# Patient Record
Sex: FEMALE | Race: WHITE
[De-identification: names, ages, dates, MRNs, and addresses within clinical notes are randomized per-mention and may not be internally consistent; named-entity substitution may affect disease eponyms.]

---

## 2018-11-02 ENCOUNTER — HOSPITAL ENCOUNTER (EMERGENCY)
Dept: HOSPITAL 84 - D.ER | Age: 43
Discharge: HOME | End: 2018-11-02
Payer: COMMERCIAL

## 2018-11-02 VITALS — WEIGHT: 260 LBS | HEIGHT: 67 IN | BODY MASS INDEX: 40.81 KG/M2 | HEIGHT: 67 IN | WEIGHT: 260 LBS

## 2018-11-02 VITALS — DIASTOLIC BLOOD PRESSURE: 88 MMHG | SYSTOLIC BLOOD PRESSURE: 162 MMHG

## 2018-11-02 DIAGNOSIS — S80.01XA: Primary | ICD-10-CM
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
## 2018-11-02 DIAGNOSIS — M25.511: ICD-10-CM
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
## 2018-11-02 DIAGNOSIS — S13.4XXA: ICD-10-CM

## 2018-11-02 DIAGNOSIS — S46.911A: ICD-10-CM

## 2018-11-02 DIAGNOSIS — R51: ICD-10-CM

## 2018-11-02 DIAGNOSIS — F17.200: ICD-10-CM

## 2018-11-02 LAB
APPEARANCE UR: CLEAR
BILIRUB SERPL-MCNC: NEGATIVE MG/DL
COLOR UR: YELLOW
GLUCOSE SERPL-MCNC: 50 MG/DL
HCG UR QL: NEGATIVE
KETONES UR STRIP-MCNC: NEGATIVE MG/DL
NITRITE UR-MCNC: NEGATIVE MG/ML
PH UR STRIP: 7 [PH] (ref 5–6)
PROT UR-MCNC: NEGATIVE MG/DL
SP GR UR STRIP: 1.01 (ref 1–1.02)
UROBILINOGEN UR-MCNC: NORMAL MG/DL

## 2018-11-08 ENCOUNTER — HOSPITAL ENCOUNTER (OUTPATIENT)
Dept: HOSPITAL 84 - D.MRI | Age: 43
Discharge: HOME | End: 2018-11-08
Attending: FAMILY MEDICINE
Payer: COMMERCIAL

## 2018-11-08 DIAGNOSIS — S06.0X0D: Primary | ICD-10-CM
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
## 2018-11-21 ENCOUNTER — HOSPITAL ENCOUNTER (EMERGENCY)
Dept: HOSPITAL 84 - D.ER | Age: 43
Discharge: HOME | End: 2018-11-21
Payer: MEDICARE

## 2018-11-21 VITALS — SYSTOLIC BLOOD PRESSURE: 118 MMHG | DIASTOLIC BLOOD PRESSURE: 82 MMHG

## 2018-11-21 VITALS — BODY MASS INDEX: 39.71 KG/M2 | HEIGHT: 67 IN | WEIGHT: 253 LBS

## 2018-11-21 DIAGNOSIS — R05: Primary | ICD-10-CM

## 2018-11-21 DIAGNOSIS — F17.200: ICD-10-CM

## 2018-11-21 DIAGNOSIS — R00.0: ICD-10-CM

## 2018-11-21 DIAGNOSIS — R06.2: ICD-10-CM

## 2018-11-21 DIAGNOSIS — R06.02: ICD-10-CM

## 2018-11-21 DIAGNOSIS — R73.9: ICD-10-CM

## 2018-11-21 LAB
ALBUMIN SERPL-MCNC: 3.4 G/DL (ref 3.4–5)
ALP SERPL-CCNC: 77 U/L (ref 46–116)
ALT SERPL-CCNC: 22 U/L (ref 10–68)
ANION GAP SERPL CALC-SCNC: 11 MMOL/L (ref 8–16)
BASOPHILS NFR BLD AUTO: 0.2 % (ref 0–2)
BILIRUB SERPL-MCNC: 0.22 MG/DL (ref 0.2–1.3)
BUN SERPL-MCNC: 21 MG/DL (ref 7–18)
CALCIUM SERPL-MCNC: 9.2 MG/DL (ref 8.5–10.1)
CHLORIDE SERPL-SCNC: 101 MMOL/L (ref 98–107)
CO2 SERPL-SCNC: 27.9 MMOL/L (ref 21–32)
CREAT SERPL-MCNC: 1 MG/DL (ref 0.6–1.3)
EOSINOPHIL NFR BLD: 0.2 % (ref 0–7)
ERYTHROCYTE [DISTWIDTH] IN BLOOD BY AUTOMATED COUNT: 26.6 % (ref 11.5–14.5)
GLOBULIN SER-MCNC: 4.5 G/L
GLUCOSE SERPL-MCNC: 298 MG/DL (ref 74–106)
HCT VFR BLD CALC: 26.2 % (ref 36–48)
HGB BLD-MCNC: 12.9 G/DL (ref 12–16)
IMM GRANULOCYTES NFR BLD: 0.3 % (ref 0–5)
LYMPHOCYTES NFR BLD AUTO: 13.9 % (ref 15–50)
MCH RBC QN AUTO: 46.2 PG (ref 26–34)
MCHC RBC AUTO-ENTMCNC: 49.2 G/DL (ref 31–37)
MCV RBC: 93.9 FL (ref 80–100)
MONOCYTES NFR BLD: 6.7 % (ref 2–11)
NEUTROPHILS NFR BLD AUTO: 78.7 % (ref 40–80)
OSMOLALITY SERPL CALC.SUM OF ELEC: 285 MOSM/KG (ref 275–300)
PLATELET # BLD: 290 10X3/UL (ref 130–400)
PMV BLD AUTO: 10.2 FL (ref 7.4–10.4)
POTASSIUM SERPL-SCNC: 3.9 MMOL/L (ref 3.5–5.1)
PROT SERPL-MCNC: 7.9 G/DL (ref 6.4–8.2)
RBC # BLD AUTO: 2.79 10X6/UL (ref 4–5.4)
SODIUM SERPL-SCNC: 136 MMOL/L (ref 136–145)
WBC # BLD AUTO: 12 10X3/UL (ref 4.8–10.8)

## 2018-11-28 ENCOUNTER — HOSPITAL ENCOUNTER (INPATIENT)
Dept: HOSPITAL 84 - D.MS | Age: 43
LOS: 5 days | Discharge: HOME | DRG: 178 | End: 2018-12-03
Attending: FAMILY MEDICINE | Admitting: FAMILY MEDICINE
Payer: COMMERCIAL

## 2018-11-28 VITALS — WEIGHT: 261.2 LBS | BODY MASS INDEX: 41 KG/M2 | BODY MASS INDEX: 41 KG/M2 | BODY MASS INDEX: 41 KG/M2 | HEIGHT: 67 IN

## 2018-11-28 VITALS — SYSTOLIC BLOOD PRESSURE: 117 MMHG | DIASTOLIC BLOOD PRESSURE: 70 MMHG

## 2018-11-28 VITALS — DIASTOLIC BLOOD PRESSURE: 62 MMHG | SYSTOLIC BLOOD PRESSURE: 107 MMHG

## 2018-11-28 VITALS — DIASTOLIC BLOOD PRESSURE: 82 MMHG | SYSTOLIC BLOOD PRESSURE: 123 MMHG

## 2018-11-28 DIAGNOSIS — E83.42: ICD-10-CM

## 2018-11-28 DIAGNOSIS — J44.1: ICD-10-CM

## 2018-11-28 DIAGNOSIS — J44.0: ICD-10-CM

## 2018-11-28 DIAGNOSIS — F17.213: ICD-10-CM

## 2018-11-28 DIAGNOSIS — J15.6: Primary | ICD-10-CM

## 2018-11-28 LAB
ALBUMIN SERPL-MCNC: 3.1 G/DL (ref 3.4–5)
ALP SERPL-CCNC: 75 U/L (ref 46–116)
ALT SERPL-CCNC: 19 U/L (ref 10–68)
ANION GAP SERPL CALC-SCNC: 11.8 MMOL/L (ref 8–16)
BASOPHILS NFR BLD AUTO: 0.1 % (ref 0–2)
BILIRUB SERPL-MCNC: 0.31 MG/DL (ref 0.2–1.3)
BUN SERPL-MCNC: 10 MG/DL (ref 7–18)
CALCIUM SERPL-MCNC: 8.7 MG/DL (ref 8.5–10.1)
CHLORIDE SERPL-SCNC: 101 MMOL/L (ref 98–107)
CO2 SERPL-SCNC: 27.4 MMOL/L (ref 21–32)
CREAT SERPL-MCNC: 0.9 MG/DL (ref 0.6–1.3)
EOSINOPHIL NFR BLD: 3.1 % (ref 0–7)
ERYTHROCYTE [DISTWIDTH] IN BLOOD BY AUTOMATED COUNT: 20.2 % (ref 11.5–14.5)
GLOBULIN SER-MCNC: 4.4 G/L
GLUCOSE SERPL-MCNC: 178 MG/DL (ref 74–106)
HCT VFR BLD CALC: 36.8 % (ref 36–48)
HGB BLD-MCNC: 12.8 G/DL (ref 12–16)
IMM GRANULOCYTES NFR BLD: 0.2 % (ref 0–5)
LYMPHOCYTES NFR BLD AUTO: 23.7 % (ref 15–50)
MCH RBC QN AUTO: 29.9 PG (ref 26–34)
MCHC RBC AUTO-ENTMCNC: 34.8 G/DL (ref 31–37)
MCV RBC: 86 FL (ref 80–100)
MONOCYTES NFR BLD: 7.8 % (ref 2–11)
NEUTROPHILS NFR BLD AUTO: 65.1 % (ref 40–80)
OSMOLALITY SERPL CALC.SUM OF ELEC: 274 MOSM/KG (ref 275–300)
PLATELET # BLD: 229 10X3/UL (ref 130–400)
PMV BLD AUTO: 10.1 FL (ref 7.4–10.4)
POTASSIUM SERPL-SCNC: 4.2 MMOL/L (ref 3.5–5.1)
PROT SERPL-MCNC: 7.5 G/DL (ref 6.4–8.2)
RBC # BLD AUTO: 4.28 10X6/UL (ref 4–5.4)
SODIUM SERPL-SCNC: 136 MMOL/L (ref 136–145)
WBC # BLD AUTO: 8.3 10X3/UL (ref 4.8–10.8)

## 2018-11-29 VITALS — SYSTOLIC BLOOD PRESSURE: 126 MMHG | DIASTOLIC BLOOD PRESSURE: 73 MMHG

## 2018-11-29 VITALS — DIASTOLIC BLOOD PRESSURE: 84 MMHG | SYSTOLIC BLOOD PRESSURE: 131 MMHG

## 2018-11-29 VITALS — DIASTOLIC BLOOD PRESSURE: 97 MMHG | SYSTOLIC BLOOD PRESSURE: 156 MMHG

## 2018-11-29 VITALS — SYSTOLIC BLOOD PRESSURE: 118 MMHG | DIASTOLIC BLOOD PRESSURE: 77 MMHG

## 2018-11-29 VITALS — DIASTOLIC BLOOD PRESSURE: 83 MMHG | SYSTOLIC BLOOD PRESSURE: 123 MMHG

## 2018-11-30 VITALS — DIASTOLIC BLOOD PRESSURE: 84 MMHG | SYSTOLIC BLOOD PRESSURE: 114 MMHG

## 2018-11-30 VITALS — DIASTOLIC BLOOD PRESSURE: 88 MMHG | SYSTOLIC BLOOD PRESSURE: 128 MMHG

## 2018-11-30 VITALS — SYSTOLIC BLOOD PRESSURE: 139 MMHG | DIASTOLIC BLOOD PRESSURE: 94 MMHG

## 2018-11-30 VITALS — DIASTOLIC BLOOD PRESSURE: 86 MMHG | SYSTOLIC BLOOD PRESSURE: 129 MMHG

## 2018-11-30 VITALS — DIASTOLIC BLOOD PRESSURE: 85 MMHG | SYSTOLIC BLOOD PRESSURE: 131 MMHG

## 2018-11-30 VITALS — SYSTOLIC BLOOD PRESSURE: 120 MMHG | DIASTOLIC BLOOD PRESSURE: 751 MMHG

## 2018-11-30 VITALS — SYSTOLIC BLOOD PRESSURE: 127 MMHG | DIASTOLIC BLOOD PRESSURE: 76 MMHG

## 2018-11-30 VITALS — DIASTOLIC BLOOD PRESSURE: 85 MMHG | SYSTOLIC BLOOD PRESSURE: 129 MMHG

## 2018-11-30 VITALS — SYSTOLIC BLOOD PRESSURE: 129 MMHG | DIASTOLIC BLOOD PRESSURE: 86 MMHG

## 2018-11-30 VITALS — SYSTOLIC BLOOD PRESSURE: 114 MMHG | DIASTOLIC BLOOD PRESSURE: 84 MMHG

## 2018-11-30 VITALS — DIASTOLIC BLOOD PRESSURE: 80 MMHG | SYSTOLIC BLOOD PRESSURE: 120 MMHG

## 2018-11-30 VITALS — DIASTOLIC BLOOD PRESSURE: 80 MMHG | SYSTOLIC BLOOD PRESSURE: 119 MMHG

## 2018-11-30 VITALS — DIASTOLIC BLOOD PRESSURE: 93 MMHG | SYSTOLIC BLOOD PRESSURE: 137 MMHG

## 2018-11-30 LAB
ALBUMIN SERPL-MCNC: 3.2 G/DL (ref 3.4–5)
ALP SERPL-CCNC: 71 U/L (ref 46–116)
ALT SERPL-CCNC: 23 U/L (ref 10–68)
ANION GAP SERPL CALC-SCNC: 13.7 MMOL/L (ref 8–16)
APTT BLD: 28.1 SECONDS (ref 22.8–39.4)
BASOPHILS NFR BLD AUTO: 0.1 % (ref 0–2)
BILIRUB SERPL-MCNC: 0.25 MG/DL (ref 0.2–1.3)
BUN SERPL-MCNC: 9 MG/DL (ref 7–18)
CALCIUM SERPL-MCNC: 9.2 MG/DL (ref 8.5–10.1)
CHLORIDE SERPL-SCNC: 100 MMOL/L (ref 98–107)
CO2 SERPL-SCNC: 29.1 MMOL/L (ref 21–32)
CREAT SERPL-MCNC: 0.7 MG/DL (ref 0.6–1.3)
EOSINOPHIL NFR BLD: 1 % (ref 0–7)
ERYTHROCYTE [DISTWIDTH] IN BLOOD BY AUTOMATED COUNT: 19.6 % (ref 11.5–14.5)
GLOBULIN SER-MCNC: 3.9 G/L
GLUCOSE SERPL-MCNC: 134 MG/DL (ref 74–106)
HCT VFR BLD CALC: 35.7 % (ref 36–48)
HGB BLD-MCNC: 12.3 G/DL (ref 12–16)
IMM GRANULOCYTES NFR BLD: 0.3 % (ref 0–5)
INR PPP: 0.99 (ref 0.85–1.17)
LYMPHOCYTES NFR BLD AUTO: 21.5 % (ref 15–50)
MAGNESIUM SERPL-MCNC: 1.6 MG/DL (ref 1.8–2.4)
MCH RBC QN AUTO: 29.7 PG (ref 26–34)
MCHC RBC AUTO-ENTMCNC: 34.5 G/DL (ref 31–37)
MCV RBC: 86.2 FL (ref 80–100)
MONOCYTES NFR BLD: 7.9 % (ref 2–11)
NEUTROPHILS NFR BLD AUTO: 69.2 % (ref 40–80)
OSMOLALITY SERPL CALC.SUM OF ELEC: 276 MOSM/KG (ref 275–300)
PLATELET # BLD: 233 10X3/UL (ref 130–400)
PMV BLD AUTO: 10.3 FL (ref 7.4–10.4)
POTASSIUM SERPL-SCNC: 4.8 MMOL/L (ref 3.5–5.1)
PROT SERPL-MCNC: 7.1 G/DL (ref 6.4–8.2)
PROTHROMBIN TIME: 12.6 SECONDS (ref 11.6–15)
RBC # BLD AUTO: 4.14 10X6/UL (ref 4–5.4)
SODIUM SERPL-SCNC: 138 MMOL/L (ref 136–145)
WBC # BLD AUTO: 6.7 10X3/UL (ref 4.8–10.8)

## 2018-11-30 PROCEDURE — 0B968ZZ DRAINAGE OF RIGHT LOWER LOBE BRONCHUS, VIA NATURAL OR ARTIFICIAL OPENING ENDOSCOPIC: ICD-10-PCS | Performed by: INTERNAL MEDICINE

## 2018-11-30 PROCEDURE — 0B978ZZ DRAINAGE OF LEFT MAIN BRONCHUS, VIA NATURAL OR ARTIFICIAL OPENING ENDOSCOPIC: ICD-10-PCS | Performed by: INTERNAL MEDICINE

## 2018-11-30 PROCEDURE — 0B948ZZ DRAINAGE OF RIGHT UPPER LOBE BRONCHUS, VIA NATURAL OR ARTIFICIAL OPENING ENDOSCOPIC: ICD-10-PCS | Performed by: INTERNAL MEDICINE

## 2018-11-30 PROCEDURE — 0B938ZZ DRAINAGE OF RIGHT MAIN BRONCHUS, VIA NATURAL OR ARTIFICIAL OPENING ENDOSCOPIC: ICD-10-PCS | Performed by: INTERNAL MEDICINE

## 2018-12-01 VITALS — DIASTOLIC BLOOD PRESSURE: 86 MMHG | SYSTOLIC BLOOD PRESSURE: 126 MMHG

## 2018-12-01 VITALS — SYSTOLIC BLOOD PRESSURE: 120 MMHG | DIASTOLIC BLOOD PRESSURE: 72 MMHG

## 2018-12-01 VITALS — DIASTOLIC BLOOD PRESSURE: 80 MMHG | SYSTOLIC BLOOD PRESSURE: 139 MMHG

## 2018-12-01 VITALS — SYSTOLIC BLOOD PRESSURE: 122 MMHG | DIASTOLIC BLOOD PRESSURE: 65 MMHG

## 2018-12-01 VITALS — SYSTOLIC BLOOD PRESSURE: 147 MMHG | DIASTOLIC BLOOD PRESSURE: 78 MMHG

## 2018-12-01 VITALS — SYSTOLIC BLOOD PRESSURE: 146 MMHG | DIASTOLIC BLOOD PRESSURE: 83 MMHG

## 2018-12-01 LAB
ALBUMIN SERPL-MCNC: 3 G/DL (ref 3.4–5)
ALP SERPL-CCNC: 73 U/L (ref 46–116)
ALT SERPL-CCNC: 17 U/L (ref 10–68)
ANION GAP SERPL CALC-SCNC: 14.6 MMOL/L (ref 8–16)
BILIRUB SERPL-MCNC: 0.53 MG/DL (ref 0.2–1.3)
BUN SERPL-MCNC: 9 MG/DL (ref 7–18)
CALCIUM SERPL-MCNC: 9.3 MG/DL (ref 8.5–10.1)
CHLORIDE SERPL-SCNC: 100 MMOL/L (ref 98–107)
CO2 SERPL-SCNC: 25.8 MMOL/L (ref 21–32)
CREAT SERPL-MCNC: 0.8 MG/DL (ref 0.6–1.3)
GLOBULIN SER-MCNC: 4 G/L
GLUCOSE SERPL-MCNC: 135 MG/DL (ref 74–106)
MAGNESIUM SERPL-MCNC: 1.5 MG/DL (ref 1.8–2.4)
OSMOLALITY SERPL CALC.SUM OF ELEC: 272 MOSM/KG (ref 275–300)
POTASSIUM SERPL-SCNC: 4.4 MMOL/L (ref 3.5–5.1)
PROT SERPL-MCNC: 7 G/DL (ref 6.4–8.2)
SODIUM SERPL-SCNC: 136 MMOL/L (ref 136–145)
SPECIMEN PREPARATION: (no result)

## 2018-12-02 VITALS — DIASTOLIC BLOOD PRESSURE: 88 MMHG | SYSTOLIC BLOOD PRESSURE: 124 MMHG

## 2018-12-02 VITALS — SYSTOLIC BLOOD PRESSURE: 139 MMHG | DIASTOLIC BLOOD PRESSURE: 76 MMHG

## 2018-12-02 VITALS — SYSTOLIC BLOOD PRESSURE: 150 MMHG | DIASTOLIC BLOOD PRESSURE: 92 MMHG

## 2018-12-02 VITALS — DIASTOLIC BLOOD PRESSURE: 85 MMHG | SYSTOLIC BLOOD PRESSURE: 151 MMHG

## 2018-12-02 VITALS — SYSTOLIC BLOOD PRESSURE: 106 MMHG | DIASTOLIC BLOOD PRESSURE: 56 MMHG

## 2018-12-02 LAB
ALBUMIN SERPL-MCNC: 2.9 G/DL (ref 3.4–5)
ALP SERPL-CCNC: 68 U/L (ref 46–116)
ALT SERPL-CCNC: 18 U/L (ref 10–68)
ANION GAP SERPL CALC-SCNC: 14.5 MMOL/L (ref 8–16)
BILIRUB SERPL-MCNC: 0.28 MG/DL (ref 0.2–1.3)
BUN SERPL-MCNC: 12 MG/DL (ref 7–18)
CALCIUM SERPL-MCNC: 8.6 MG/DL (ref 8.5–10.1)
CHLORIDE SERPL-SCNC: 103 MMOL/L (ref 98–107)
CO2 SERPL-SCNC: 25.6 MMOL/L (ref 21–32)
CREAT SERPL-MCNC: 0.8 MG/DL (ref 0.6–1.3)
GLOBULIN SER-MCNC: 4.2 G/L
GLUCOSE SERPL-MCNC: 143 MG/DL (ref 74–106)
MAGNESIUM SERPL-MCNC: 1.5 MG/DL (ref 1.8–2.4)
OSMOLALITY SERPL CALC.SUM OF ELEC: 279 MOSM/KG (ref 275–300)
POTASSIUM SERPL-SCNC: 4.1 MMOL/L (ref 3.5–5.1)
PROT SERPL-MCNC: 7.1 G/DL (ref 6.4–8.2)
SODIUM SERPL-SCNC: 139 MMOL/L (ref 136–145)

## 2018-12-03 VITALS — DIASTOLIC BLOOD PRESSURE: 73 MMHG | SYSTOLIC BLOOD PRESSURE: 126 MMHG

## 2018-12-03 VITALS — SYSTOLIC BLOOD PRESSURE: 128 MMHG | DIASTOLIC BLOOD PRESSURE: 86 MMHG

## 2018-12-03 LAB
ALBUMIN SERPL-MCNC: 2.9 G/DL (ref 3.4–5)
ALP SERPL-CCNC: 67 U/L (ref 46–116)
ALT SERPL-CCNC: 16 U/L (ref 10–68)
ANION GAP SERPL CALC-SCNC: 15.3 MMOL/L (ref 8–16)
BILIRUB SERPL-MCNC: 0.29 MG/DL (ref 0.2–1.3)
BUN SERPL-MCNC: 13 MG/DL (ref 7–18)
CALCIUM SERPL-MCNC: 8.2 MG/DL (ref 8.5–10.1)
CHLORIDE SERPL-SCNC: 102 MMOL/L (ref 98–107)
CO2 SERPL-SCNC: 25.2 MMOL/L (ref 21–32)
CREAT SERPL-MCNC: 0.8 MG/DL (ref 0.6–1.3)
GLOBULIN SER-MCNC: 3.9 G/L
GLUCOSE SERPL-MCNC: 141 MG/DL (ref 74–106)
MAGNESIUM SERPL-MCNC: 1.5 MG/DL (ref 1.8–2.4)
OSMOLALITY SERPL CALC.SUM OF ELEC: 277 MOSM/KG (ref 275–300)
POTASSIUM SERPL-SCNC: 4.5 MMOL/L (ref 3.5–5.1)
PROT SERPL-MCNC: 6.8 G/DL (ref 6.4–8.2)
SODIUM SERPL-SCNC: 138 MMOL/L (ref 136–145)

## 2018-12-06 LAB — FUNGUS MYCOLOGY CULTURE: (no result)

## 2019-05-26 ENCOUNTER — HOSPITAL ENCOUNTER (OUTPATIENT)
Dept: HOSPITAL 84 - D.ER | Age: 44
Setting detail: OBSERVATION
LOS: 3 days | Discharge: HOME HEALTH | End: 2019-05-29
Attending: INTERNAL MEDICINE | Admitting: INTERNAL MEDICINE
Payer: COMMERCIAL

## 2019-05-26 VITALS — SYSTOLIC BLOOD PRESSURE: 131 MMHG | DIASTOLIC BLOOD PRESSURE: 92 MMHG

## 2019-05-26 VITALS — DIASTOLIC BLOOD PRESSURE: 72 MMHG | SYSTOLIC BLOOD PRESSURE: 131 MMHG

## 2019-05-26 VITALS — HEIGHT: 67 IN | BODY MASS INDEX: 42.06 KG/M2 | BODY MASS INDEX: 42.06 KG/M2 | WEIGHT: 268 LBS

## 2019-05-26 VITALS — DIASTOLIC BLOOD PRESSURE: 79 MMHG | SYSTOLIC BLOOD PRESSURE: 136 MMHG

## 2019-05-26 VITALS — DIASTOLIC BLOOD PRESSURE: 71 MMHG | SYSTOLIC BLOOD PRESSURE: 131 MMHG

## 2019-05-26 VITALS — SYSTOLIC BLOOD PRESSURE: 118 MMHG | DIASTOLIC BLOOD PRESSURE: 73 MMHG

## 2019-05-26 DIAGNOSIS — E83.42: ICD-10-CM

## 2019-05-26 DIAGNOSIS — R16.1: ICD-10-CM

## 2019-05-26 DIAGNOSIS — K52.9: Primary | ICD-10-CM

## 2019-05-26 DIAGNOSIS — F17.213: ICD-10-CM

## 2019-05-26 DIAGNOSIS — R73.9: ICD-10-CM

## 2019-05-26 DIAGNOSIS — K76.0: ICD-10-CM

## 2019-05-26 LAB
ALBUMIN SERPL-MCNC: 3.4 G/DL (ref 3.4–5)
ALP SERPL-CCNC: 68 U/L (ref 46–116)
ALT SERPL-CCNC: 35 U/L (ref 10–68)
AMYLASE SERPL-CCNC: 27 U/L (ref 25–115)
ANION GAP SERPL CALC-SCNC: 15.8 MMOL/L (ref 8–16)
APPEARANCE UR: CLEAR
BASOPHILS NFR BLD AUTO: 0.2 % (ref 0–2)
BILIRUB SERPL-MCNC: 0.42 MG/DL (ref 0.2–1.3)
BILIRUB SERPL-MCNC: NEGATIVE MG/DL
BUN SERPL-MCNC: 10 MG/DL (ref 7–18)
CALCIUM SERPL-MCNC: 9.2 MG/DL (ref 8.5–10.1)
CHLORIDE SERPL-SCNC: 101 MMOL/L (ref 98–107)
CO2 SERPL-SCNC: 23.3 MMOL/L (ref 21–32)
COLOR UR: (no result)
CREAT SERPL-MCNC: 0.8 MG/DL (ref 0.6–1.3)
EOSINOPHIL NFR BLD: 0.7 % (ref 0–7)
ERYTHROCYTE [DISTWIDTH] IN BLOOD BY AUTOMATED COUNT: 16 % (ref 11.5–14.5)
GLOBULIN SER-MCNC: 4.1 G/L
GLUCOSE SERPL-MCNC: 174 MG/DL (ref 74–106)
GLUCOSE SERPL-MCNC: NEGATIVE MG/DL
HCG SERPL-ACNC: NEGATIVE M[IU]/ML
HCT VFR BLD CALC: 39 % (ref 36–48)
HGB BLD-MCNC: 13.4 G/DL (ref 12–16)
IMM GRANULOCYTES NFR BLD: 0.2 % (ref 0–5)
KETONES UR STRIP-MCNC: NEGATIVE MG/DL
LIPASE SERPL-CCNC: 60 U/L (ref 73–393)
LYMPHOCYTES NFR BLD AUTO: 12.6 % (ref 15–50)
MCH RBC QN AUTO: 28.8 PG (ref 26–34)
MCHC RBC AUTO-ENTMCNC: 34.4 G/DL (ref 31–37)
MCV RBC: 83.7 FL (ref 80–100)
MONOCYTES NFR BLD: 5.3 % (ref 2–11)
NEUTROPHILS NFR BLD AUTO: 81 % (ref 40–80)
NITRITE UR-MCNC: NEGATIVE MG/ML
OSMOLALITY SERPL CALC.SUM OF ELEC: 274 MOSM/KG (ref 275–300)
PH UR STRIP: 7 [PH] (ref 5–6)
PLATELET # BLD: 176 10X3/UL (ref 130–400)
PMV BLD AUTO: 10.3 FL (ref 7.4–10.4)
POTASSIUM SERPL-SCNC: 4.1 MMOL/L (ref 3.5–5.1)
PROT SERPL-MCNC: 7.5 G/DL (ref 6.4–8.2)
PROT UR-MCNC: NEGATIVE MG/DL
RBC # BLD AUTO: 4.66 10X6/UL (ref 4–5.4)
SODIUM SERPL-SCNC: 136 MMOL/L (ref 136–145)
SP GR UR STRIP: 1 (ref 1–1.02)
TROPONIN I SERPL-MCNC: < 0.017 NG/ML (ref 0–0.06)
UROBILINOGEN UR-MCNC: NORMAL MG/DL
WBC # BLD AUTO: 9.8 10X3/UL (ref 4.8–10.8)

## 2019-05-26 NOTE — NUR
PT VOMITED 200 ML GREENISH/BROWN LIQUID.  GAVE ZOFRAN IVP PER PRN ORDER. WILL
CONTINUE TO MONITOR FOR NEEDS. ENCOURAGED JUST ICE CHIPS FOR A WHILE TO LET
STOMACH SETTLE.

## 2019-05-26 NOTE — NUR
REPORT RECEIVED AND CARE OF PT ASSUMED. PT LYING IN SUPINE POSITION WITH EYES
CLOSED. IV IN RIGHT HAND PATENT WITH NS INFUSING  ML / HR. FAMILY
MEMBERS ARE AT BEDSIDE. WILL MONITOR FOR NEEDS.

## 2019-05-26 NOTE — NUR
PT RECEIVED TO ROOM. ALERT AND ORIENTED WITH VERBALIZED ABDOMINAL PAIN AND
DISCOMFORT. GENERALIZED TENDERNESS NOTED ON PALPATION WITH BS NOTED ON
AUSULTATION X4. ORIENTED TO ROOM AND CALL LIGHT WITH  PRESENT.
ENCOURAGED TO USE CALL LIGHT FOR ASSSIST. IVF INFUSING RT. A/C AT PRESCRIBED
RATE.

## 2019-05-27 VITALS — SYSTOLIC BLOOD PRESSURE: 127 MMHG | DIASTOLIC BLOOD PRESSURE: 58 MMHG

## 2019-05-27 VITALS — SYSTOLIC BLOOD PRESSURE: 152 MMHG | DIASTOLIC BLOOD PRESSURE: 58 MMHG

## 2019-05-27 VITALS — DIASTOLIC BLOOD PRESSURE: 59 MMHG | SYSTOLIC BLOOD PRESSURE: 116 MMHG

## 2019-05-27 VITALS — DIASTOLIC BLOOD PRESSURE: 74 MMHG | SYSTOLIC BLOOD PRESSURE: 156 MMHG

## 2019-05-27 VITALS — DIASTOLIC BLOOD PRESSURE: 70 MMHG | SYSTOLIC BLOOD PRESSURE: 130 MMHG

## 2019-05-27 LAB
ALBUMIN SERPL-MCNC: 3.1 G/DL (ref 3.4–5)
ALP SERPL-CCNC: 58 U/L (ref 46–116)
ALT SERPL-CCNC: 26 U/L (ref 10–68)
ANION GAP SERPL CALC-SCNC: 11.9 MMOL/L (ref 8–16)
BASOPHILS NFR BLD AUTO: 0.2 % (ref 0–2)
BILIRUB SERPL-MCNC: 0.39 MG/DL (ref 0.2–1.3)
BUN SERPL-MCNC: 8 MG/DL (ref 7–18)
CALCIUM SERPL-MCNC: 8 MG/DL (ref 8.5–10.1)
CHLORIDE SERPL-SCNC: 103 MMOL/L (ref 98–107)
CO2 SERPL-SCNC: 28.1 MMOL/L (ref 21–32)
CREAT SERPL-MCNC: 0.8 MG/DL (ref 0.6–1.3)
EOSINOPHIL NFR BLD: 2.5 % (ref 0–7)
ERYTHROCYTE [DISTWIDTH] IN BLOOD BY AUTOMATED COUNT: 16.2 % (ref 11.5–14.5)
GLOBULIN SER-MCNC: 4 G/L
GLUCOSE SERPL-MCNC: 137 MG/DL (ref 74–106)
HCT VFR BLD CALC: 37.4 % (ref 36–48)
HGB BLD-MCNC: 12.2 G/DL (ref 12–16)
IMM GRANULOCYTES NFR BLD: 0.2 % (ref 0–5)
LYMPHOCYTES NFR BLD AUTO: 18.9 % (ref 15–50)
MAGNESIUM SERPL-MCNC: 1.4 MG/DL (ref 1.8–2.4)
MCH RBC QN AUTO: 28.1 PG (ref 26–34)
MCHC RBC AUTO-ENTMCNC: 32.6 G/DL (ref 31–37)
MCV RBC: 86.2 FL (ref 80–100)
MONOCYTES NFR BLD: 8.4 % (ref 2–11)
NEUTROPHILS NFR BLD AUTO: 69.8 % (ref 40–80)
OSMOLALITY SERPL CALC.SUM OF ELEC: 277 MOSM/KG (ref 275–300)
PHOSPHATE SERPL-MCNC: 3 MG/DL (ref 2.5–4.9)
PLATELET # BLD: 152 10X3/UL (ref 130–400)
PMV BLD AUTO: 10 FL (ref 7.4–10.4)
POTASSIUM SERPL-SCNC: 4 MMOL/L (ref 3.5–5.1)
PROT SERPL-MCNC: 7.1 G/DL (ref 6.4–8.2)
RBC # BLD AUTO: 4.34 10X6/UL (ref 4–5.4)
SODIUM SERPL-SCNC: 139 MMOL/L (ref 136–145)
WBC # BLD AUTO: 6.3 10X3/UL (ref 4.8–10.8)

## 2019-05-27 NOTE — NUR
ASSESSMENT AS PER FLOWSHEET. IV PATENT RT HAND OF NS AT 100CC'S/HR SITE CLEAR.
SALINE LOCK PATENT RT AC. SPOUSE AT BEDSIDE. DENIES NEEDS. SR UP X2 CALL LIGHT
WITHIN REACH. DRINKIG LEMONLIME SODA.

## 2019-05-28 VITALS — DIASTOLIC BLOOD PRESSURE: 85 MMHG | SYSTOLIC BLOOD PRESSURE: 163 MMHG

## 2019-05-28 VITALS — DIASTOLIC BLOOD PRESSURE: 94 MMHG | SYSTOLIC BLOOD PRESSURE: 172 MMHG

## 2019-05-28 VITALS — DIASTOLIC BLOOD PRESSURE: 91 MMHG | SYSTOLIC BLOOD PRESSURE: 170 MMHG

## 2019-05-28 VITALS — DIASTOLIC BLOOD PRESSURE: 71 MMHG | SYSTOLIC BLOOD PRESSURE: 143 MMHG

## 2019-05-28 VITALS — SYSTOLIC BLOOD PRESSURE: 153 MMHG | DIASTOLIC BLOOD PRESSURE: 84 MMHG

## 2019-05-28 VITALS — SYSTOLIC BLOOD PRESSURE: 137 MMHG | DIASTOLIC BLOOD PRESSURE: 82 MMHG

## 2019-05-28 LAB
ALBUMIN SERPL-MCNC: 3.4 G/DL (ref 3.4–5)
ALP SERPL-CCNC: 59 U/L (ref 46–116)
ALT SERPL-CCNC: 27 U/L (ref 10–68)
ANION GAP SERPL CALC-SCNC: 9.9 MMOL/L (ref 8–16)
BASOPHILS NFR BLD AUTO: 0.2 % (ref 0–2)
BILIRUB SERPL-MCNC: 0.39 MG/DL (ref 0.2–1.3)
BUN SERPL-MCNC: 8 MG/DL (ref 7–18)
CALCIUM SERPL-MCNC: 8.3 MG/DL (ref 8.5–10.1)
CHLORIDE SERPL-SCNC: 104 MMOL/L (ref 98–107)
CO2 SERPL-SCNC: 28.7 MMOL/L (ref 21–32)
CREAT SERPL-MCNC: 0.9 MG/DL (ref 0.6–1.3)
EOSINOPHIL NFR BLD: 2 % (ref 0–7)
ERYTHROCYTE [DISTWIDTH] IN BLOOD BY AUTOMATED COUNT: 16.1 % (ref 11.5–14.5)
GLOBULIN SER-MCNC: 3.9 G/L
GLUCOSE SERPL-MCNC: 127 MG/DL (ref 74–106)
HCT VFR BLD CALC: 37.8 % (ref 36–48)
HGB BLD-MCNC: 12.5 G/DL (ref 12–16)
IMM GRANULOCYTES NFR BLD: 0.7 % (ref 0–5)
LYMPHOCYTES NFR BLD AUTO: 24.8 % (ref 15–50)
MAGNESIUM SERPL-MCNC: 1.9 MG/DL (ref 1.8–2.4)
MCH RBC QN AUTO: 28.3 PG (ref 26–34)
MCHC RBC AUTO-ENTMCNC: 33.1 G/DL (ref 31–37)
MCV RBC: 85.7 FL (ref 80–100)
MONOCYTES NFR BLD: 8.7 % (ref 2–11)
NEUTROPHILS NFR BLD AUTO: 63.6 % (ref 40–80)
OSMOLALITY SERPL CALC.SUM OF ELEC: 277 MOSM/KG (ref 275–300)
PLATELET # BLD: 175 10X3/UL (ref 130–400)
PMV BLD AUTO: 10.3 FL (ref 7.4–10.4)
POTASSIUM SERPL-SCNC: 3.6 MMOL/L (ref 3.5–5.1)
PROT SERPL-MCNC: 7.3 G/DL (ref 6.4–8.2)
RBC # BLD AUTO: 4.41 10X6/UL (ref 4–5.4)
SODIUM SERPL-SCNC: 139 MMOL/L (ref 136–145)
WBC # BLD AUTO: 5.5 10X3/UL (ref 4.8–10.8)

## 2019-05-28 NOTE — NUR
ASSESSMENT AS PER FLOWSHEET. IV PATENT RT HAND OF NS AT 100CC'S/HR SALINE LOCK
PATENT RT AC. FAMILY MEMBER AT BEDSIDE. TELM. SHOWS SR WITH HR 77.

## 2019-05-28 NOTE — NUR
PATIENT IN BED WITH IV INTACT. NO COMPLAINTS OR SIGNS OF DISTRESS. FAMILY AT
BEDSIDE. CALL LIGHT WITHIN REACH.

## 2019-05-28 NOTE — NUR
C/O PAIN IN ABDOMEN AND NAUSEA NO EMESIS NOTED. DILAUDID 0.5MG IVP AND ZOFRAN
4MG IVP GIVEN FOR RELIEF.

## 2019-05-29 VITALS — DIASTOLIC BLOOD PRESSURE: 64 MMHG | SYSTOLIC BLOOD PRESSURE: 134 MMHG

## 2019-05-29 VITALS — DIASTOLIC BLOOD PRESSURE: 93 MMHG | SYSTOLIC BLOOD PRESSURE: 171 MMHG

## 2019-05-29 VITALS — DIASTOLIC BLOOD PRESSURE: 77 MMHG | SYSTOLIC BLOOD PRESSURE: 136 MMHG

## 2019-05-29 LAB
ALBUMIN SERPL-MCNC: 3.1 G/DL (ref 3.4–5)
ALP SERPL-CCNC: 53 U/L (ref 46–116)
ALT SERPL-CCNC: 30 U/L (ref 10–68)
ANION GAP SERPL CALC-SCNC: 11.4 MMOL/L (ref 8–16)
BASOPHILS NFR BLD AUTO: 0.3 % (ref 0–2)
BILIRUB SERPL-MCNC: 0.36 MG/DL (ref 0.2–1.3)
BUN SERPL-MCNC: 6 MG/DL (ref 7–18)
CALCIUM SERPL-MCNC: 7.9 MG/DL (ref 8.5–10.1)
CHLORIDE SERPL-SCNC: 104 MMOL/L (ref 98–107)
CO2 SERPL-SCNC: 26 MMOL/L (ref 21–32)
CREAT SERPL-MCNC: 0.8 MG/DL (ref 0.6–1.3)
EOSINOPHIL NFR BLD: 3.3 % (ref 0–7)
ERYTHROCYTE [DISTWIDTH] IN BLOOD BY AUTOMATED COUNT: 16.1 % (ref 11.5–14.5)
GLOBULIN SER-MCNC: 3.7 G/L
GLUCOSE SERPL-MCNC: 129 MG/DL (ref 74–106)
HCT VFR BLD CALC: 36.5 % (ref 36–48)
HGB BLD-MCNC: 12.1 G/DL (ref 12–16)
IMM GRANULOCYTES NFR BLD: 0.3 % (ref 0–5)
LYMPHOCYTES NFR BLD AUTO: 23.6 % (ref 15–50)
MAGNESIUM SERPL-MCNC: 1.8 MG/DL (ref 1.8–2.4)
MCH RBC QN AUTO: 28.3 PG (ref 26–34)
MCHC RBC AUTO-ENTMCNC: 33.2 G/DL (ref 31–37)
MCV RBC: 85.3 FL (ref 80–100)
MONOCYTES NFR BLD: 9 % (ref 2–11)
NEUTROPHILS NFR BLD AUTO: 63.5 % (ref 40–80)
OSMOLALITY SERPL CALC.SUM OF ELEC: 275 MOSM/KG (ref 275–300)
PLATELET # BLD: 158 10X3/UL (ref 130–400)
PMV BLD AUTO: 10.2 FL (ref 7.4–10.4)
POTASSIUM SERPL-SCNC: 3.4 MMOL/L (ref 3.5–5.1)
PROT SERPL-MCNC: 6.8 G/DL (ref 6.4–8.2)
RBC # BLD AUTO: 4.28 10X6/UL (ref 4–5.4)
SODIUM SERPL-SCNC: 138 MMOL/L (ref 136–145)
WBC # BLD AUTO: 4 10X3/UL (ref 4.8–10.8)

## 2019-05-29 NOTE — NUR
NUTRITION F/U
DIET ADVANCED TO REG. NPO AFTER MN FOR  PROCEDURE TOMORROW. WILL PROVIDE DIET
WHEN RESUMED, MONITOR INTAKE.
RD FOLLOWING

## 2019-05-30 ENCOUNTER — HOSPITAL ENCOUNTER (OUTPATIENT)
Dept: HOSPITAL 84 - D.OPS | Age: 44
Discharge: HOME | End: 2019-05-30
Attending: SURGERY
Payer: COMMERCIAL

## 2019-05-30 VITALS
HEIGHT: 67 IN | BODY MASS INDEX: 42.06 KG/M2 | BODY MASS INDEX: 42.06 KG/M2 | WEIGHT: 268 LBS | HEIGHT: 67 IN | WEIGHT: 268 LBS

## 2019-05-30 VITALS — DIASTOLIC BLOOD PRESSURE: 92 MMHG | SYSTOLIC BLOOD PRESSURE: 153 MMHG

## 2019-05-30 DIAGNOSIS — Z01.812: ICD-10-CM

## 2019-05-30 DIAGNOSIS — K80.10: Primary | ICD-10-CM

## 2019-05-30 DIAGNOSIS — K76.0: ICD-10-CM

## 2019-05-30 NOTE — NUR
REC'D FROM RR. FRIEND/FAMILY AT BEDSIDE. DROWSY BUT RESPONDS TO
VERBAL STIMULI. DRESSING CDI TO SURGICAL INCISIONS X3 TO ABDOMEN. ICE
CHIPS BROUGHT TO PT.

## 2019-05-30 NOTE — NUR
SITTING UP IN A CHAIR. HAS EATEN SOME PUDDING AND DRANKED SOME
APPLE JUICE. DIZZY WHEN UP. FRIEND/FAMILY AT BEDSIDE.

## 2019-05-30 NOTE — NUR
PATIENT CALLS AND REPORTS URGE TO URINATE, PATIENT SITTING IN CHAIR,
AMBULATES TO BATHROOM WITH STAND-BY ASSIST WITHOUT UNSTEADINESS.
PATIENT STATES DIZZINESS IS A LOT BETTER.  PATIENT VOIDS MODERATE
AMOUNT IN TOILET WITHOUT DIFFICULTY.  LEFT AC PIV DC'D WITH TIP
INTACT.  ABDOMINAL BANDAIDS C/D/I.  PATIENT DRESSING IN PERSONAL
CLOTHINNG

## 2019-05-30 NOTE — NUR
CONSULTED ANESTHESIA REGARDING BRADYCARDIA 47 BPM. BLOOD PRESSURE
162/94, . DR. JOHNSON AT BEDSIDE. NO NEW ORDERS RECEIVED. PATIENT
MEETS DISCHARGE CRITERIA FROM PACU PER ANESTHESIA.

## 2019-05-30 NOTE — NUR
PATIENT WALKING IN HALLWAY.  DISCHARGE INSTRUCTIONS REVIEWED WITH
PATIENT, DISCHARGED HOME VIA WHEELCHAIR TO PRIVATE VEHICLE WITH
SPOUSE

## 2019-05-31 NOTE — HP
PATIENT: PAM ARRIAGA APRIL                           MEDICAL RECORD: [id number]
ACCOUNT: [id number]                                    LOCATION:[facility]         
: 75                                            ADMISSION DATE: 19
                                                         PCP: NAIF FRIED MD    
 
                             HISTORY AND PHYSICAL EXAMINATION
 
 
CHIEF COMPLAINT:  Biliary dyskinesia.
 
HISTORY OF PRESENT ILLNESS:  The patient has biliary dyskinesia.  I am going to
plan for laparoscopic cholecystectomy and intraoperative cholangiography with
possible liver biopsy.
 
PAST MEDICAL AND SURGICAL HISTORY:  Four  sections and arthritis of the
back.
 
SOCIAL HISTORY:  Previous smoker.
 
REVIEW OF SYSTEMS:  No shortness of breath.
 
ALLERGIES:  PENICILLIN WHICH CAUSES SWELLING.
 
HOME MEDICINES:  Trazodone, Levaquin, and Flagyl.
 
PHYSICAL EXAMINATION:
GENERAL:  The patient does not appear acutely ill.  She does not appear
chronically ill.
VITAL SIGNS:  Reviewed.
EARS:  External ears appear normal.
EYES:  Extraocular movements are intact.
NECK:  Trachea is midline.
CHEST:  No intercostal retractions.
PULMONARY:  Nonlabored.  No stridor.
 
RADIOLOGIC DATA:  The patient was found to have complete akinesia of the
gallbladder on Pipida scan.
 
IMPRESSION:  Biliary dyskinesia.
 
PLAN:  Plan will be laparoscopic cholecystectomy, intraoperative cholangiogram,
and possible liver biopsy.  The risks, possible complications, and alternatives
to the procedure were explained to the patient.  She elects to proceed.
 
TRANSINT:[id number] Voice Confirmation ID: [id number] DOCUMENT ID: [id number]
 
 
                                           
                                           CARLITOS WOODARD MD            
 
 
 
Electronically Signed by CARLITOS WOODARD on 19 at 1122
CC: NAIF FRIED MD                                         [id number]
DICTATION DATE: 19 1430     :     19 1445      [facility] 
                                                                      19
[place] [phone]

## 2019-08-15 ENCOUNTER — HOSPITAL ENCOUNTER (OUTPATIENT)
Dept: HOSPITAL 84 - D.NM | Age: 44
Discharge: HOME | End: 2019-08-15
Attending: INTERNAL MEDICINE
Payer: COMMERCIAL

## 2019-08-15 VITALS — BODY MASS INDEX: 42.1 KG/M2

## 2019-08-15 DIAGNOSIS — R10.9: Primary | ICD-10-CM
